# Patient Record
Sex: MALE | Race: WHITE | NOT HISPANIC OR LATINO | URBAN - METROPOLITAN AREA
[De-identification: names, ages, dates, MRNs, and addresses within clinical notes are randomized per-mention and may not be internally consistent; named-entity substitution may affect disease eponyms.]

---

## 2023-12-04 ENCOUNTER — OFFICE VISIT (OUTPATIENT)
Dept: RADIATION ONCOLOGY | Facility: CLINIC | Age: 83
End: 2023-12-04
Payer: MEDICARE

## 2023-12-04 VITALS
RESPIRATION RATE: 18 BRPM | HEIGHT: 68 IN | SYSTOLIC BLOOD PRESSURE: 94 MMHG | WEIGHT: 207.44 LBS | TEMPERATURE: 97 F | OXYGEN SATURATION: 97 % | HEART RATE: 102 BPM | DIASTOLIC BLOOD PRESSURE: 56 MMHG | BODY MASS INDEX: 31.44 KG/M2

## 2023-12-04 DIAGNOSIS — C61 PROSTATE CANCER METASTATIC TO BONE: Primary | ICD-10-CM

## 2023-12-04 DIAGNOSIS — C79.51 PROSTATE CANCER METASTATIC TO BONE: Primary | ICD-10-CM

## 2023-12-04 PROCEDURE — 1101F PR PT FALLS ASSESS DOC 0-1 FALLS W/OUT INJ PAST YR: ICD-10-PCS | Mod: CPTII,S$GLB,, | Performed by: SPECIALIST

## 2023-12-04 PROCEDURE — 3078F DIAST BP <80 MM HG: CPT | Mod: CPTII,S$GLB,, | Performed by: SPECIALIST

## 2023-12-04 PROCEDURE — 1126F AMNT PAIN NOTED NONE PRSNT: CPT | Mod: CPTII,S$GLB,, | Performed by: SPECIALIST

## 2023-12-04 PROCEDURE — 1101F PT FALLS ASSESS-DOCD LE1/YR: CPT | Mod: CPTII,S$GLB,, | Performed by: SPECIALIST

## 2023-12-04 PROCEDURE — 99205 OFFICE O/P NEW HI 60 MIN: CPT | Mod: S$GLB,,, | Performed by: SPECIALIST

## 2023-12-04 PROCEDURE — 99205 PR OFFICE/OUTPT VISIT, NEW, LEVL V, 60-74 MIN: ICD-10-PCS | Mod: S$GLB,,, | Performed by: SPECIALIST

## 2023-12-04 PROCEDURE — 1159F MED LIST DOCD IN RCRD: CPT | Mod: CPTII,S$GLB,, | Performed by: SPECIALIST

## 2023-12-04 PROCEDURE — 1159F PR MEDICATION LIST DOCUMENTED IN MEDICAL RECORD: ICD-10-PCS | Mod: CPTII,S$GLB,, | Performed by: SPECIALIST

## 2023-12-04 PROCEDURE — 3074F PR MOST RECENT SYSTOLIC BLOOD PRESSURE < 130 MM HG: ICD-10-PCS | Mod: CPTII,S$GLB,, | Performed by: SPECIALIST

## 2023-12-04 PROCEDURE — 3288F FALL RISK ASSESSMENT DOCD: CPT | Mod: CPTII,S$GLB,, | Performed by: SPECIALIST

## 2023-12-04 PROCEDURE — 1126F PR PAIN SEVERITY QUANTIFIED, NO PAIN PRESENT: ICD-10-PCS | Mod: CPTII,S$GLB,, | Performed by: SPECIALIST

## 2023-12-04 PROCEDURE — 99999 PR PBB SHADOW E&M-NEW PATIENT-LVL IV: CPT | Mod: PBBFAC,,, | Performed by: SPECIALIST

## 2023-12-04 PROCEDURE — 3288F PR FALLS RISK ASSESSMENT DOCUMENTED: ICD-10-PCS | Mod: CPTII,S$GLB,, | Performed by: SPECIALIST

## 2023-12-04 PROCEDURE — 3074F SYST BP LT 130 MM HG: CPT | Mod: CPTII,S$GLB,, | Performed by: SPECIALIST

## 2023-12-04 PROCEDURE — 3078F PR MOST RECENT DIASTOLIC BLOOD PRESSURE < 80 MM HG: ICD-10-PCS | Mod: CPTII,S$GLB,, | Performed by: SPECIALIST

## 2023-12-04 PROCEDURE — 99999 PR PBB SHADOW E&M-NEW PATIENT-LVL IV: ICD-10-PCS | Mod: PBBFAC,,, | Performed by: SPECIALIST

## 2023-12-04 RX ORDER — FUROSEMIDE 20 MG/1
20 TABLET ORAL DAILY PRN
COMMUNITY
Start: 2023-07-24

## 2023-12-04 RX ORDER — POTASSIUM CHLORIDE 750 MG/1
10 TABLET, EXTENDED RELEASE ORAL DAILY PRN
COMMUNITY
Start: 2023-07-24

## 2023-12-04 RX ORDER — CLOPIDOGREL BISULFATE 75 MG/1
75 TABLET ORAL DAILY
COMMUNITY
Start: 2023-11-20

## 2023-12-04 RX ORDER — METFORMIN HYDROCHLORIDE 1000 MG/1
1000 TABLET ORAL 2 TIMES DAILY
COMMUNITY

## 2023-12-04 RX ORDER — PEN NEEDLE, DIABETIC 30 GX3/16"
NEEDLE, DISPOSABLE MISCELLANEOUS
COMMUNITY
Start: 2023-07-07

## 2023-12-04 RX ORDER — TRAMADOL HYDROCHLORIDE 50 MG/1
TABLET ORAL
COMMUNITY

## 2023-12-04 RX ORDER — INSULIN ASPART 100 [IU]/ML
40 INJECTION, SOLUTION INTRAVENOUS; SUBCUTANEOUS
COMMUNITY
Start: 2023-09-05

## 2023-12-04 RX ORDER — PREDNISONE 5 MG/1
5 TABLET ORAL
COMMUNITY

## 2023-12-04 RX ORDER — GABAPENTIN 300 MG/1
300 CAPSULE ORAL
COMMUNITY
Start: 2023-05-22

## 2023-12-04 RX ORDER — SYRINGE AND NEEDLE,INSULIN,1ML 31GX15/64"
1 SYRINGE, EMPTY DISPOSABLE MISCELLANEOUS
COMMUNITY
Start: 2023-07-24

## 2023-12-04 RX ORDER — NAPROXEN SODIUM 220 MG/1
1 TABLET, FILM COATED ORAL DAILY
COMMUNITY

## 2023-12-04 RX ORDER — ISOSORBIDE DINITRATE 10 MG/1
10 TABLET ORAL 2 TIMES DAILY
COMMUNITY

## 2023-12-04 RX ORDER — TAMSULOSIN HYDROCHLORIDE 0.4 MG/1
1 CAPSULE ORAL
COMMUNITY

## 2023-12-04 RX ORDER — ATORVASTATIN CALCIUM 40 MG/1
40 TABLET, FILM COATED ORAL NIGHTLY
COMMUNITY
Start: 2023-10-27

## 2023-12-04 RX ORDER — METOPROLOL SUCCINATE 25 MG/1
1 TABLET, EXTENDED RELEASE ORAL EVERY MORNING
COMMUNITY
Start: 2023-07-24

## 2023-12-05 PROBLEM — C79.51 PROSTATE CANCER METASTATIC TO BONE: Status: ACTIVE | Noted: 2023-12-05

## 2023-12-05 PROBLEM — C61 PROSTATE CANCER METASTATIC TO BONE: Status: ACTIVE | Noted: 2023-12-05

## 2023-12-05 NOTE — PROGRESS NOTES
I have been asked to see this delightful 83-year-old gentleman with a rising PSA on hormonal therapy for prostate cancer metastatic to bone to discuss potential prostate only treatment.  History of present illness:  Mr. Turcios was found to have a PSA of 3.66 and on 05/24/2012 underwent prostate biopsies with 6 of 6 cores involved with variable amounts of [place]'s 3+4 disease with noted perineural invasion.  At that time he elected to proceed with alternative therapies.  In approximately 2015 he developed metastatic disease to bone and was begun on Depo Lupron as well as bone agents with complete clearing of his radiographic evidence of metastatic disease to bone.  He was briefly tried on Xtandi but did not tolerate this and has therefore been continued on Lupron and Zometa.  On this regimen his PSA has slowly been rising to the 10 range.  Due to this he underwent Pylera 5 scanning on 11/09/2023 which shows multifocal Pylera 5 uptake in the prostate gland but no evidence of metastatic disease to lymph nodes or bone.  Because of this he is had discussions regarding prostate only radiotherapy with Dr. Bobby Da Silva and is referred here for this.  He currently has significant obstructive voiding symptoms getting up to urinate hourly or every hour and a half at night.  He is tolerating hormonal therapy very well and has no complaints of hot flashes fatigue or noted loss of muscle mass.  He is a diabetic with heart disease and has slowly gained weight through this time.  His tissue was sent for genetic profiling via [id number] and he was found to have an AA are L7 O2 H mutation.  His mutational burden was 9.5 mutations/Mb and he had no micro satellite instability.  At the current time he is not complaining of bone pain other than some chronic low back and rib pain associated with prior injuries.  Past medical history:  Hypertension 20 years with TIA 2 years prior manifest as expressive aphasia  Disease with prior  CABG with recent cardiac catheterization 1 month ago which was good per the patient's history  Diabetes mellitus onset 1996, insulin-requiring for 12 years with diabetic polyneuropathy  Gastroesophageal reflux disease  Past surgical history:  Three-vessel coronary artery bypass grafting 2018  Placement of pacemaker for sick sinus syndrome  Left thoracotomy and partial pneumonectomy for benign disease  Accident age 14 with multiple lumbar spine fractures without surgery  Fall from truck with bilateral broken ribs 7 years prior still with discomfort  Allergies: He has no known allergies  Habits:  He smoked greater than 2 packs a day from age [age].  He does not drink.  Family history:  His mother had pancreatic cancer  Social:  He lives in [place] with his wife who is present and supportive.  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  Although he was at the [employer] he is unaware of any exposures.  Review of systems:  Noncontributory.  Physical examination:  He is a well-developed well-nourished gentleman in no apparent distress.  He is alert and oriented.  On cardiovascular exam he has a regular rhythm and rate without rub or gallop.  He has a soft systolic ejection murmur. He does have a pacemaker  His lungs are clear to auscultation  On  exam he is a normal male with both testes descended without masses.  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  As he has a rising PSA with his only site of disease on Pylera 5 scanning to be as prostate I feel it is reasonable to consider prostate only radiotherapy for more durable long-term control.  Hopefully would be able to stop hormonal therapy although at his age  I have informed him that it is unlikely he will recover his testosterone.  I am concerned about his lower urinary tract symptoms and I have recommended that he proceed with his next Depo Lupron shot and discuss possible interventions with Dr. Mao.  In may be that he requires some kind of minimal channel cessation procedure or may have irritated bladder symptoms.  I have informed him that should he proceed with radiotherapy that it would worsen irritative bladder symptoms.  Plan:  He is scheduled to see Dr. Mao later this month.  I will defer management of his lower urinary tract symptoms.  I will see him back in 3 months' time for further discussions regarding local prostate radiotherapy.  I certainly appreciate participating in this delightful gentleman's care.

## 2024-03-06 ENCOUNTER — OFFICE VISIT (OUTPATIENT)
Dept: RADIATION ONCOLOGY | Facility: CLINIC | Age: 84
End: 2024-03-06
Payer: MEDICARE

## 2024-03-06 VITALS
BODY MASS INDEX: 31.04 KG/M2 | OXYGEN SATURATION: 93 % | TEMPERATURE: 97 F | DIASTOLIC BLOOD PRESSURE: 62 MMHG | WEIGHT: 204.81 LBS | HEIGHT: 68 IN | HEART RATE: 85 BPM | SYSTOLIC BLOOD PRESSURE: 133 MMHG

## 2024-03-06 DIAGNOSIS — C79.51 PROSTATE CANCER METASTATIC TO BONE: Primary | ICD-10-CM

## 2024-03-06 DIAGNOSIS — C61 PROSTATE CANCER METASTATIC TO BONE: Primary | ICD-10-CM

## 2024-03-06 PROCEDURE — 1126F AMNT PAIN NOTED NONE PRSNT: CPT | Mod: CPTII,S$GLB,, | Performed by: SPECIALIST

## 2024-03-06 PROCEDURE — 99999 PR PBB SHADOW E&M-EST. PATIENT-LVL III: CPT | Mod: PBBFAC,,, | Performed by: SPECIALIST

## 2024-03-06 PROCEDURE — 3078F DIAST BP <80 MM HG: CPT | Mod: CPTII,S$GLB,, | Performed by: SPECIALIST

## 2024-03-06 PROCEDURE — 3075F SYST BP GE 130 - 139MM HG: CPT | Mod: CPTII,S$GLB,, | Performed by: SPECIALIST

## 2024-03-06 PROCEDURE — 99212 OFFICE O/P EST SF 10 MIN: CPT | Mod: S$GLB,,, | Performed by: SPECIALIST

## 2024-03-06 PROCEDURE — 3288F FALL RISK ASSESSMENT DOCD: CPT | Mod: CPTII,S$GLB,, | Performed by: SPECIALIST

## 2024-03-06 PROCEDURE — 1159F MED LIST DOCD IN RCRD: CPT | Mod: CPTII,S$GLB,, | Performed by: SPECIALIST

## 2024-03-06 PROCEDURE — 1101F PT FALLS ASSESS-DOCD LE1/YR: CPT | Mod: CPTII,S$GLB,, | Performed by: SPECIALIST

## 2024-03-06 NOTE — PROGRESS NOTES
Mr. Ramos returns for follow-up at my request.  We were considering prostate only radiotherapy in the setting of metastatic disease that had responded to therapy and was no longer visible.  In the interval since I saw him last he has had a trans urethral resection of the prostate and his urinary tract symptoms have resolved.  He did have significant volume overload after surgery and was in heart failure for a few days but then recovered.  During that evaluation Dr. Mao got a bone scan which shows metastatic disease at multiple sites including left scapula right posterior 6th rib T11 and L2.  There were other sites such as T4, L5 and bilateral pelvis which are also suspicious.  He has some discomfort in his left scapula but otherwise is doing fairly well.  Physical examination:  He has no longer in heart failure.  He has some discomfort with percussion of his scapula.  His other sites are not tender to percussion.  Impression: At this point he may be better suited by Lutathera.  He will require another PSMA PET scan to see if these sites seen on bone scan or now PSMA avid or not.  I have discussed case with Dr. Alonso who will see him later this week.  Plan: I will see him in 6 months or as needed for palliation of metastatic bone disease.  I certainly appreciate participating in this delightful gentleman's care.

## 2024-09-06 ENCOUNTER — OFFICE VISIT (OUTPATIENT)
Dept: RADIATION ONCOLOGY | Facility: CLINIC | Age: 84
End: 2024-09-06
Payer: MEDICARE

## 2024-09-06 VITALS
WEIGHT: 188.06 LBS | OXYGEN SATURATION: 90 % | HEIGHT: 68 IN | RESPIRATION RATE: 18 BRPM | DIASTOLIC BLOOD PRESSURE: 62 MMHG | HEART RATE: 96 BPM | BODY MASS INDEX: 28.5 KG/M2 | TEMPERATURE: 98 F | SYSTOLIC BLOOD PRESSURE: 119 MMHG

## 2024-09-06 DIAGNOSIS — C79.51 PROSTATE CANCER METASTATIC TO BONE: Primary | ICD-10-CM

## 2024-09-06 DIAGNOSIS — C61 PROSTATE CANCER METASTATIC TO BONE: Primary | ICD-10-CM

## 2024-09-06 PROCEDURE — 99999 PR PBB SHADOW E&M-EST. PATIENT-LVL IV: CPT | Mod: PBBFAC,,, | Performed by: SPECIALIST

## 2024-09-06 NOTE — PROGRESS NOTES
Mr. Nix returns for follow-up with metastatic prostate cancer.  When I saw him last he had multiple sites of disease including left scapular right posterior rib T11 and L2.  He was beginning to develop pain but now is on taxanes and his pain is decreasing.  He states his only sites of discomfort now are his left  Shoulder in his right posterior ribs.  Impression: I have informed him that he will likely continue to get pain relief.  Should he develop progressive pain I will be happy to see him sooner than his scheduled 6 months follow-up to discuss palliative radiotherapy.  In this setting I do not believe that prostate only radiotherapy is on the table.  Plan: I will see him back in 6 months' time.  I appreciate participating in this delightful gentleman's care.